# Patient Record
Sex: FEMALE | Race: WHITE
[De-identification: names, ages, dates, MRNs, and addresses within clinical notes are randomized per-mention and may not be internally consistent; named-entity substitution may affect disease eponyms.]

---

## 2023-12-13 ENCOUNTER — NON-APPOINTMENT (OUTPATIENT)
Age: 23
End: 2023-12-13

## 2023-12-14 ENCOUNTER — TRANSCRIPTION ENCOUNTER (OUTPATIENT)
Age: 23
End: 2023-12-14

## 2023-12-14 ENCOUNTER — APPOINTMENT (OUTPATIENT)
Dept: PLASTIC SURGERY | Facility: CLINIC | Age: 23
End: 2023-12-14
Payer: COMMERCIAL

## 2023-12-14 DIAGNOSIS — F64.9 GENDER IDENTITY DISORDER, UNSPECIFIED: ICD-10-CM

## 2023-12-14 PROBLEM — Z00.00 ENCOUNTER FOR PREVENTIVE HEALTH EXAMINATION: Status: ACTIVE | Noted: 2023-12-14

## 2023-12-14 PROCEDURE — 99204 OFFICE O/P NEW MOD 45 MIN: CPT

## 2023-12-17 RX ORDER — PROGESTERONE 200 MG/1
CAPSULE ORAL
Refills: 0 | Status: ACTIVE | COMMUNITY

## 2023-12-17 RX ORDER — ESTRADIOL VALERATE 20 MG/ML
20 INJECTION INTRAMUSCULAR
Refills: 0 | Status: ACTIVE | COMMUNITY

## 2023-12-17 RX ORDER — OMEPRAZOLE 40 MG/1
40 CAPSULE, DELAYED RELEASE ORAL
Refills: 0 | Status: ACTIVE | COMMUNITY

## 2023-12-19 NOTE — ASSESSMENT
[FreeTextEntry1] : Pt was seen and examined together by KALYN Aquino and Dr. Navid Gomes. Assessment and plan formulated and discussed at time of visit.

## 2023-12-19 NOTE — HISTORY OF PRESENT ILLNESS
[FreeTextEntry1] : Juana is a 23-year-old transgender female designated male at birth with a history of gender dysphoria she reports that her masculine facial features cause and exacerbate her feelings of.  Gender dysphoria and result in misgendering.  She started transitioning both socially in  October 2021, and medically April 2022.  She currently takes estradiol, progesterone and omeprazole.  She has a past medical history of depression anxiety and GERD.  She denies history of previous gender affirming surgeries or other procedures no other past surgical history.  She denies tobacco use.  Reports positive THC use.  She currently receives transgender care at [facility] in [facility] and receives mental health services through a private counselor     .

## 2024-01-19 ENCOUNTER — OUTPATIENT (OUTPATIENT)
Dept: OUTPATIENT SERVICES | Facility: HOSPITAL | Age: 24
LOS: 1 days | End: 2024-01-19
Payer: COMMERCIAL

## 2024-01-19 ENCOUNTER — RESULT REVIEW (OUTPATIENT)
Age: 24
End: 2024-01-19

## 2024-01-19 ENCOUNTER — APPOINTMENT (OUTPATIENT)
Age: 24
End: 2024-01-19
Payer: COMMERCIAL

## 2024-01-19 DIAGNOSIS — F64.9 GENDER IDENTITY DISORDER, UNSPECIFIED: ICD-10-CM

## 2024-01-19 DIAGNOSIS — Z00.8 ENCOUNTER FOR OTHER GENERAL EXAMINATION: ICD-10-CM

## 2024-01-19 PROCEDURE — 70486 CT MAXILLOFACIAL W/O DYE: CPT | Mod: 26

## 2024-01-19 PROCEDURE — 70486 CT MAXILLOFACIAL W/O DYE: CPT

## 2024-01-20 DIAGNOSIS — F64.9 GENDER IDENTITY DISORDER, UNSPECIFIED: ICD-10-CM

## 2024-12-06 VITALS
TEMPERATURE: 98 F | RESPIRATION RATE: 16 BRPM | HEART RATE: 81 BPM | WEIGHT: 223.55 LBS | DIASTOLIC BLOOD PRESSURE: 74 MMHG | OXYGEN SATURATION: 97 % | HEIGHT: 70 IN | SYSTOLIC BLOOD PRESSURE: 126 MMHG

## 2024-12-06 NOTE — ASU PATIENT PROFILE, ADULT - NSICDXPASTMEDICALHX_GEN_ALL_CORE_FT
PAST MEDICAL HISTORY:  Dyslipidemia     Gender identity uncertainty in adult     GERD (gastroesophageal reflux disease)

## 2024-12-09 ENCOUNTER — TRANSCRIPTION ENCOUNTER (OUTPATIENT)
Age: 24
End: 2024-12-09

## 2024-12-09 ENCOUNTER — APPOINTMENT (OUTPATIENT)
Dept: PLASTIC SURGERY | Facility: HOSPITAL | Age: 24
End: 2024-12-09

## 2024-12-09 ENCOUNTER — INPATIENT (INPATIENT)
Facility: HOSPITAL | Age: 24
LOS: 0 days | Discharge: HOME | End: 2024-12-10
Attending: SURGERY | Admitting: SURGERY
Payer: COMMERCIAL

## 2024-12-09 PROCEDURE — 21172 RCNST SUPR-LAT ORB RM&LW FHD: CPT

## 2024-12-09 PROCEDURE — 15769 GRFG AUTOL SOFT TISS DIR EXC: CPT

## 2024-12-09 PROCEDURE — 21139 RDCTJ FOREHEAD CNTRG&SETBACK: CPT

## 2024-12-09 PROCEDURE — 21193 RECONST LWR JAW W/O GRAFT: CPT | Mod: 50

## 2024-12-09 PROCEDURE — 21256 RECONSTRUCTION OF ORBIT: CPT | Mod: 50

## 2024-12-09 PROCEDURE — 15824 RHYTIDECTOMY FOREHEAD: CPT | Mod: 50

## 2024-12-09 PROCEDURE — 21122 GENIOP SLDG OSTEOT 2/>: CPT

## 2024-12-09 DEVICE — IMP IPS CUTTING GUIDE TRANSFORM LP XS: Type: IMPLANTABLE DEVICE | Status: FUNCTIONAL

## 2024-12-09 DEVICE — SURGCEL 4 X 8": Type: IMPLANTABLE DEVICE | Status: FUNCTIONAL

## 2024-12-09 DEVICE — IMP IPS MARKING GUIDE CRANIAL TRANSFORM EXTRA LP: Type: IMPLANTABLE DEVICE | Status: FUNCTIONAL

## 2024-12-09 DEVICE — IMP IPS CUTTING GUIDE W/PLANNING TRANSFORM LP: Type: IMPLANTABLE DEVICE | Status: FUNCTIONAL

## 2024-12-09 DEVICE — SCREW MAXDRIVE 1 LVL 2X7MM: Type: IMPLANTABLE DEVICE | Status: FUNCTIONAL

## 2024-12-09 RX ORDER — OMEPRAZOLE 20 MG/1
1 CAPSULE, DELAYED RELEASE ORAL
Refills: 0 | DISCHARGE

## 2024-12-09 RX ORDER — ACETAMINOPHEN 500MG 500 MG/1
1000 TABLET, COATED ORAL EVERY 8 HOURS
Refills: 0 | Status: DISCONTINUED | OUTPATIENT
Start: 2024-12-09 | End: 2024-12-10

## 2024-12-09 RX ORDER — CHLORHEXIDINE GLUCONATE, 0.12% ORAL RINSE 1.2 MG/ML
0.12 SOLUTION DENTAL
Qty: 2 | Refills: 0 | Status: ACTIVE | COMMUNITY
Start: 2024-12-09 | End: 1900-01-01

## 2024-12-09 RX ORDER — OXYCODONE AND ACETAMINOPHEN 5; 325 MG/1; MG/1
5-325 TABLET ORAL
Qty: 25 | Refills: 0 | Status: ACTIVE | COMMUNITY
Start: 2024-12-09 | End: 1900-01-01

## 2024-12-09 RX ORDER — ONDANSETRON HYDROCHLORIDE 4 MG/1
4 TABLET, FILM COATED ORAL EVERY 6 HOURS
Refills: 0 | Status: DISCONTINUED | OUTPATIENT
Start: 2024-12-09 | End: 2024-12-10

## 2024-12-09 RX ORDER — CHLORHEXIDINE GLUCONATE 1.2 MG/ML
15 RINSE ORAL
Refills: 0 | Status: DISCONTINUED | OUTPATIENT
Start: 2024-12-09 | End: 2024-12-10

## 2024-12-09 RX ORDER — DIAZEPAM 10 MG/1
5 TABLET ORAL EVERY 8 HOURS
Refills: 0 | Status: DISCONTINUED | OUTPATIENT
Start: 2024-12-09 | End: 2024-12-10

## 2024-12-09 RX ORDER — OXYCODONE HYDROCHLORIDE 30 MG/1
5 TABLET ORAL EVERY 4 HOURS
Refills: 0 | Status: DISCONTINUED | OUTPATIENT
Start: 2024-12-09 | End: 2024-12-10

## 2024-12-09 RX ORDER — POVIDONE, POLYVINYL ALCOHOL 20; 27 G/1000ML; G/1000ML
2 SOLUTION OPHTHALMIC
Refills: 0 | Status: DISCONTINUED | OUTPATIENT
Start: 2024-12-09 | End: 2024-12-10

## 2024-12-09 RX ORDER — ACETAMINOPHEN 500MG 500 MG/1
975 TABLET, COATED ORAL EVERY 8 HOURS
Refills: 0 | Status: DISCONTINUED | OUTPATIENT
Start: 2024-12-09 | End: 2024-12-10

## 2024-12-09 RX ORDER — 0.9 % SODIUM CHLORIDE 0.9 %
1000 INTRAVENOUS SOLUTION INTRAVENOUS
Refills: 0 | Status: DISCONTINUED | OUTPATIENT
Start: 2024-12-09 | End: 2024-12-09

## 2024-12-09 RX ORDER — METOCLOPRAMIDE HYDROCHLORIDE 10 MG/1
10 TABLET ORAL EVERY 8 HOURS
Refills: 0 | Status: DISCONTINUED | OUTPATIENT
Start: 2024-12-09 | End: 2024-12-10

## 2024-12-09 RX ORDER — SENNOSIDES 8.6 MG
2 TABLET ORAL AT BEDTIME
Refills: 0 | Status: DISCONTINUED | OUTPATIENT
Start: 2024-12-09 | End: 2024-12-10

## 2024-12-09 RX ORDER — 0.9 % SODIUM CHLORIDE 0.9 %
1000 INTRAVENOUS SOLUTION INTRAVENOUS
Refills: 0 | Status: DISCONTINUED | OUTPATIENT
Start: 2024-12-09 | End: 2024-12-10

## 2024-12-09 RX ORDER — HYDROMORPHONE HYDROCHLORIDE 2 MG/1
0.5 TABLET ORAL
Refills: 0 | Status: DISCONTINUED | OUTPATIENT
Start: 2024-12-09 | End: 2024-12-09

## 2024-12-09 RX ORDER — PANTOPRAZOLE SODIUM 40 MG/1
40 TABLET, DELAYED RELEASE ORAL
Refills: 0 | Status: DISCONTINUED | OUTPATIENT
Start: 2024-12-09 | End: 2024-12-10

## 2024-12-09 RX ORDER — ESTRADIOL 0.05MG/24H
0.4 PATCH, TRANSDERMAL SEMIWEEKLY TRANSDERMAL
Refills: 0 | DISCHARGE

## 2024-12-09 RX ORDER — DEXAMETHASONE 1.5 MG/1
10 TABLET ORAL EVERY 12 HOURS
Refills: 0 | Status: DISCONTINUED | OUTPATIENT
Start: 2024-12-09 | End: 2024-12-09

## 2024-12-09 RX ORDER — CEFAZOLIN SODIUM 10 G
2000 VIAL (EA) INJECTION EVERY 8 HOURS
Refills: 0 | Status: DISCONTINUED | OUTPATIENT
Start: 2024-12-09 | End: 2024-12-10

## 2024-12-09 RX ORDER — ACETAMINOPHEN, DIPHENHYDRAMINE HCL, PHENYLEPHRINE HCL 325; 25; 5 MG/1; MG/1; MG/1
3 TABLET ORAL AT BEDTIME
Refills: 0 | Status: DISCONTINUED | OUTPATIENT
Start: 2024-12-09 | End: 2024-12-10

## 2024-12-09 RX ORDER — DEXAMETHASONE 1.5 MG/1
10 TABLET ORAL EVERY 12 HOURS
Refills: 0 | Status: DISCONTINUED | OUTPATIENT
Start: 2024-12-09 | End: 2024-12-10

## 2024-12-09 RX ORDER — DIPHENHYDRAMINE HCL 25 MG
25 CAPSULE ORAL EVERY 4 HOURS
Refills: 0 | Status: DISCONTINUED | OUTPATIENT
Start: 2024-12-09 | End: 2024-12-10

## 2024-12-09 RX ORDER — GABAPENTIN 300 MG/1
300 CAPSULE ORAL EVERY 12 HOURS
Refills: 0 | Status: DISCONTINUED | OUTPATIENT
Start: 2024-12-09 | End: 2024-12-10

## 2024-12-09 RX ORDER — BENZOCAINE, MENTHOL 15; 3.6 MG/1; MG/1
1 LOZENGE ORAL EVERY 4 HOURS
Refills: 0 | Status: DISCONTINUED | OUTPATIENT
Start: 2024-12-09 | End: 2024-12-10

## 2024-12-09 RX ORDER — PETROLATUM 960 MG/G
1 OINTMENT TOPICAL EVERY 4 HOURS
Refills: 0 | Status: DISCONTINUED | OUTPATIENT
Start: 2024-12-09 | End: 2024-12-10

## 2024-12-09 RX ORDER — AMOXICILLIN AND CLAVULANATE POTASSIUM 500; 125 MG/1; MG/1
500-125 TABLET, FILM COATED ORAL TWICE DAILY
Qty: 6 | Refills: 0 | Status: ACTIVE | COMMUNITY
Start: 2024-12-09 | End: 1900-01-01

## 2024-12-09 RX ORDER — OXYCODONE HYDROCHLORIDE 30 MG/1
10 TABLET ORAL EVERY 4 HOURS
Refills: 0 | Status: DISCONTINUED | OUTPATIENT
Start: 2024-12-09 | End: 2024-12-10

## 2024-12-09 RX ORDER — LEVOCETIRIZINE DIHYDROCHLORIDE 5 MG/1
1 TABLET ORAL
Refills: 0 | DISCHARGE

## 2024-12-09 RX ADMIN — OXYCODONE HYDROCHLORIDE 10 MILLIGRAM(S): 30 TABLET ORAL at 15:54

## 2024-12-09 RX ADMIN — ACETAMINOPHEN 500MG 400 MILLIGRAM(S): 500 TABLET, COATED ORAL at 17:44

## 2024-12-09 RX ADMIN — HYDROMORPHONE HYDROCHLORIDE 0.5 MILLIGRAM(S): 2 TABLET ORAL at 12:55

## 2024-12-09 RX ADMIN — CHLORHEXIDINE GLUCONATE 15 MILLILITER(S): 1.2 RINSE ORAL at 18:02

## 2024-12-09 RX ADMIN — Medication 100 MILLIGRAM(S): at 16:40

## 2024-12-09 RX ADMIN — Medication 2 TABLET(S): at 23:49

## 2024-12-09 RX ADMIN — OXYCODONE HYDROCHLORIDE 10 MILLIGRAM(S): 30 TABLET ORAL at 21:56

## 2024-12-09 RX ADMIN — HYDROMORPHONE HYDROCHLORIDE 0.5 MILLIGRAM(S): 2 TABLET ORAL at 12:26

## 2024-12-09 RX ADMIN — DEXAMETHASONE 102 MILLIGRAM(S): 1.5 TABLET ORAL at 18:02

## 2024-12-09 RX ADMIN — OXYCODONE HYDROCHLORIDE 10 MILLIGRAM(S): 30 TABLET ORAL at 20:56

## 2024-12-09 RX ADMIN — GABAPENTIN 300 MILLIGRAM(S): 300 CAPSULE ORAL at 18:02

## 2024-12-09 RX ADMIN — OXYCODONE HYDROCHLORIDE 10 MILLIGRAM(S): 30 TABLET ORAL at 14:54

## 2024-12-09 RX ADMIN — Medication 100 MILLIGRAM(S): at 23:49

## 2024-12-09 RX ADMIN — ACETAMINOPHEN, DIPHENHYDRAMINE HCL, PHENYLEPHRINE HCL 3 MILLIGRAM(S): 325; 25; 5 TABLET ORAL at 23:49

## 2024-12-09 NOTE — PRE-ANESTHESIA EVALUATION ADULT - NSANTHOSAYNRD_GEN_A_CORE
negative...
No. VIOLETA screening performed.  STOP BANG Legend: 0-2 = LOW Risk; 3-4 = INTERMEDIATE Risk; 5-8 = HIGH Risk

## 2024-12-10 ENCOUNTER — TRANSCRIPTION ENCOUNTER (OUTPATIENT)
Age: 24
End: 2024-12-10

## 2024-12-10 VITALS
DIASTOLIC BLOOD PRESSURE: 74 MMHG | RESPIRATION RATE: 17 BRPM | HEART RATE: 97 BPM | TEMPERATURE: 97 F | OXYGEN SATURATION: 97 % | SYSTOLIC BLOOD PRESSURE: 145 MMHG

## 2024-12-10 PROCEDURE — C1889: CPT

## 2024-12-10 RX ADMIN — DEXAMETHASONE 102 MILLIGRAM(S): 1.5 TABLET ORAL at 05:19

## 2024-12-10 RX ADMIN — OXYCODONE HYDROCHLORIDE 5 MILLIGRAM(S): 30 TABLET ORAL at 06:18

## 2024-12-10 RX ADMIN — OXYCODONE HYDROCHLORIDE 10 MILLIGRAM(S): 30 TABLET ORAL at 10:30

## 2024-12-10 RX ADMIN — PANTOPRAZOLE SODIUM 40 MILLIGRAM(S): 40 TABLET, DELAYED RELEASE ORAL at 07:19

## 2024-12-10 RX ADMIN — OXYCODONE HYDROCHLORIDE 10 MILLIGRAM(S): 30 TABLET ORAL at 14:45

## 2024-12-10 RX ADMIN — GABAPENTIN 300 MILLIGRAM(S): 300 CAPSULE ORAL at 05:18

## 2024-12-10 RX ADMIN — ACETAMINOPHEN 500MG 400 MILLIGRAM(S): 500 TABLET, COATED ORAL at 10:01

## 2024-12-10 RX ADMIN — OXYCODONE HYDROCHLORIDE 10 MILLIGRAM(S): 30 TABLET ORAL at 14:02

## 2024-12-10 RX ADMIN — OXYCODONE HYDROCHLORIDE 10 MILLIGRAM(S): 30 TABLET ORAL at 09:59

## 2024-12-10 RX ADMIN — CHLORHEXIDINE GLUCONATE 15 MILLILITER(S): 1.2 RINSE ORAL at 05:18

## 2024-12-10 RX ADMIN — Medication 100 MILLIGRAM(S): at 07:35

## 2024-12-10 RX ADMIN — OXYCODONE HYDROCHLORIDE 5 MILLIGRAM(S): 30 TABLET ORAL at 05:18

## 2024-12-10 RX ADMIN — ACETAMINOPHEN 500MG 400 MILLIGRAM(S): 500 TABLET, COATED ORAL at 01:31

## 2024-12-10 NOTE — DISCHARGE NOTE NURSING/CASE MANAGEMENT/SOCIAL WORK - PATIENT PORTAL LINK FT
You can access the FollowMyHealth Patient Portal offered by [facility] by registering at the following website: [email or web address]. By joining [employer]’s FollowMyHealth portal, you will also be able to view your health information using other applications (apps) compatible with our system.

## 2024-12-10 NOTE — PROGRESS NOTE ADULT - SUBJECTIVE AND OBJECTIVE BOX
SUBJECTIVE:  Pt seen and evaluated at bedside this AM. Pt doing well this AM, slept comfortably o/n. Tolerating liquids, denies nausea/vomiting. Pain well controlled w/ current regimen.     MEDICATIONS  (STANDING):  acetaminophen     Tablet .. 975 milliGRAM(s) Oral every 8 hours  acetaminophen   IVPB .. 1000 milliGRAM(s) IV Intermittent every 8 hours  ceFAZolin   IVPB 2000 milliGRAM(s) IV Intermittent every 8 hours  chlorhexidine 0.12% Liquid 15 milliLiter(s) Swish and Spit two times a day  dexAMETHasone  IVPB 10 milliGRAM(s) IV Intermittent every 12 hours  gabapentin 300 milliGRAM(s) Oral every 12 hours  lactated ringers. 1000 milliLiter(s) (75 mL/Hr) IV Continuous <Continuous>  melatonin 3 milliGRAM(s) Oral at bedtime  pantoprazole    Tablet 40 milliGRAM(s) Oral before breakfast  senna 2 Tablet(s) Oral at bedtime    MEDICATIONS  (PRN):  AQUAPHOR (petrolatum Ointment) 1 Application(s) Topical every 4 hours PRN dry lips  artificial tears (preservative free) Ophthalmic Solution 2 Drop(s) Both EYES every 2 hours PRN Dry Eyes  benzocaine/menthol Lozenge 1 Lozenge Oral every 4 hours PRN Sore Throat  diazepam    Tablet 5 milliGRAM(s) Oral every 8 hours PRN Anxiety  diphenhydrAMINE 25 milliGRAM(s) Oral every 4 hours PRN Itching  metoclopramide Injectable 10 milliGRAM(s) IV Push every 8 hours PRN Nausea and/or Vomiting  ondansetron Injectable 4 milliGRAM(s) IV Push every 6 hours PRN Nausea and/or Vomiting  oxyCODONE    IR 5 milliGRAM(s) Oral every 4 hours PRN Moderate Pain (4 - 6)  oxyCODONE    IR 10 milliGRAM(s) Oral every 4 hours PRN Severe Pain (7 - 10)      Vital Signs Last 24 Hrs  T(C): 36.8 (10 Dec 2024 05:12), Max: 37.6 (09 Dec 2024 14:44)  T(F): 98.3 (10 Dec 2024 05:12), Max: 99.6 (09 Dec 2024 14:44)  HR: 75 (10 Dec 2024 05:12) (72 - 100)  BP: 142/76 (10 Dec 2024 05:12) (131/82 - 155/98)  BP(mean): 110 (09 Dec 2024 13:33) (99 - 115)  RR: 18 (10 Dec 2024 05:12) (12 - 18)  SpO2: 98% (10 Dec 2024 05:12) (93% - 98%)    Parameters below as of 10 Dec 2024 05:12  Patient On (Oxygen Delivery Method): room air        Physical Exam:  General: NAD, resting comfortably in bed  Head: moderate facial swelling, JPx1 serosang, incision cdi   Pulmonary: Nonlabored breathing, no respiratory distress  Cardiovascular: NSR  Abdominal: soft, NT/ND  Extremities: WWP, normal strength  Neuro: A/O x 3, CNs II-XII grossly intact, no focal deficits    I&O's Summary    09 Dec 2024 07:01  -  10 Dec 2024 07:00  --------------------------------------------------------  IN: 1150 mL / OUT: 4255 mL / NET: -3105 mL        LABS:              CAPILLARY BLOOD GLUCOSE            RADIOLOGY & ADDITIONAL STUDIES:

## 2024-12-10 NOTE — ASU DISCHARGE PLAN (ADULT/PEDIATRIC) - CARE PROVIDER_API CALL
Navid Gomes  Plastic Surgery  [address], [address]  [place] [address]  Phone: [phone]  Fax: [phone]  Follow Up Time: 1 week

## 2024-12-10 NOTE — ASU DISCHARGE PLAN (ADULT/PEDIATRIC) - ASU DC SPECIAL INSTRUCTIONSFT
MEDICATIONS:  >> A prescription for pain medication was e-prescribed to your pharmacy. Please take this as needed for severe pain.   >> Do not drive while taking the prescribed pain medication  >> Do not take the pain medication on an empty stomach, this may make you nauseous  >> Constipation is not uncommon when taking prescription pain medication. You may take an over the counter stool softener like Dulcolax or Sennakot to help with this.   >> You may take over the counter pain medication such as tylenol (acetaminophen) or Advil (ibuprofen) for pain.   >> Please use peridex rinse - swish and spit twice daily for 7 days.     ACTIVITY:  >> No bending or heavy lifting. Keep your head above the level of your heart. At your first post-op visit we will assess how you are doing and will adjust the restrictions as needed.   >> Sleep with head elevated on at least 2 pillows.     DIET:  >> Intraoral incisions: Clear liquid diet for first 48 hours then may advance to full liquid diet for 7 days. Then advance to soft diet. Avoid overly hot, cold, spicy, or acidic foods.   >> Do not drink alcohol in the immediate postoperative period and while taking prescription pain medication.     WOUND CARE:  >> Gently brush teeth and keep mouth clean after eating. At your first postop visit we will assess the wound and instruct you of any care needed.   >> Do not get your face wet, but you can wash your hair and the rest of your body. If you have a nasal splint - DO NOT GET THIS WET.   >> You may gently apply ice packs, or frozen peas to the nose and eye regions. Apply this for the first 48-72 hours. 20 minutes on and 20 minutes off. If you have a nasal splint, DO NOT apply ice to your nose.   >> Keep nasal splint on until your next postop visit, do not get this wet. Change gauze under nose as needed.  Nose will bleed over the next day or two.  This is normal.  It will be a mix of blood and mucus.  If you are noticing bleeding that does not stop or is saturating more than 2-3 gauzes per hour please call our office.   >> If you were given a facial/chin garment (Jaw Bra), please wear this 24/7 until your first postop visit.      Contact us with any questions or concerns. Please follow up with Dr. Ibarra within 1 week of discharge from the hospital. You may call [phone] to schedule an appointment.

## 2024-12-10 NOTE — ASU DISCHARGE PLAN (ADULT/PEDIATRIC) - FINANCIAL ASSISTANCE
[facility] provides services at a reduced cost to those who are determined to be eligible through [facility]’s financial assistance program. Information regarding [facility]’s financial assistance program can be found by going to [email or web address] or by calling [phone].

## 2024-12-10 NOTE — PATIENT PROFILE ADULT - FALL HARM RISK - HARM RISK INTERVENTIONS

## 2024-12-10 NOTE — DISCHARGE NOTE NURSING/CASE MANAGEMENT/SOCIAL WORK - NSDCPETBCESMAN_GEN_ALL_CORE
Please refill script per Dr Jarrett  Below patient     Clonazepan 1 mg   [facility]     has appt 8/14   If you are a smoker, it is important for your health to stop smoking. Please be aware that second hand smoke is also harmful.

## 2024-12-10 NOTE — DISCHARGE NOTE NURSING/CASE MANAGEMENT/SOCIAL WORK - FINANCIAL ASSISTANCE
[facility] provides services at a reduced cost to those who are determined to be eligible through [facility]’s financial assistance program. Information regarding [facility]’s financial assistance program can be found by going to [email or web address] or by calling [phone].

## 2024-12-10 NOTE — PROGRESS NOTE ADULT - ASSESSMENT
23yo M to F s/p FFS forehead reduction, frontal sinus reduction, supra and lateral orbital rim reduction, rhinoplasty, septoplasty, tracheal plasty, genioplasty, mandibular angle reduction and facial graft to lips.     - ATTILA removed  - FFS protocol: IVHL, CLD

## 2024-12-10 NOTE — ASU DISCHARGE PLAN (ADULT/PEDIATRIC) - PROCEDURE
forehead reduction, frontal sinus reduction, supra and lateral orbital rim reduction, rhinoplasty, septoplasty, tracheal plasty, genioplasty, mandibular angle reduction and facial graft to lips.

## 2024-12-17 DIAGNOSIS — F64.9 GENDER IDENTITY DISORDER, UNSPECIFIED: ICD-10-CM

## 2024-12-20 ENCOUNTER — APPOINTMENT (OUTPATIENT)
Dept: PLASTIC SURGERY | Facility: CLINIC | Age: 24
End: 2024-12-20
Payer: COMMERCIAL

## 2024-12-20 DIAGNOSIS — F64.9 GENDER IDENTITY DISORDER, UNSPECIFIED: ICD-10-CM

## 2024-12-20 PROCEDURE — 99024 POSTOP FOLLOW-UP VISIT: CPT

## 2024-12-20 RX ORDER — CHLORHEXIDINE GLUCONATE, 0.12% ORAL RINSE 1.2 MG/ML
0.12 SOLUTION DENTAL
Qty: 210 | Refills: 0 | Status: ACTIVE | COMMUNITY
Start: 2024-12-20 | End: 1900-01-01

## 2024-12-24 PROBLEM — K21.9 GASTRO-ESOPHAGEAL REFLUX DISEASE WITHOUT ESOPHAGITIS: Chronic | Status: ACTIVE | Noted: 2024-12-06

## 2024-12-24 PROBLEM — F66 OTHER SEXUAL DISORDERS: Chronic | Status: ACTIVE | Noted: 2024-12-06

## 2024-12-24 PROBLEM — E78.5 HYPERLIPIDEMIA, UNSPECIFIED: Chronic | Status: ACTIVE | Noted: 2024-12-06

## 2025-01-08 ENCOUNTER — APPOINTMENT (OUTPATIENT)
Dept: PLASTIC SURGERY | Facility: CLINIC | Age: 25
End: 2025-01-08
Payer: COMMERCIAL

## 2025-01-08 DIAGNOSIS — F64.9 GENDER IDENTITY DISORDER, UNSPECIFIED: ICD-10-CM

## 2025-01-08 PROCEDURE — 99024 POSTOP FOLLOW-UP VISIT: CPT

## 2025-01-30 ENCOUNTER — APPOINTMENT (OUTPATIENT)
Dept: PLASTIC SURGERY | Facility: CLINIC | Age: 25
End: 2025-01-30
Payer: COMMERCIAL

## 2025-01-30 DIAGNOSIS — F64.9 GENDER IDENTITY DISORDER, UNSPECIFIED: ICD-10-CM

## 2025-01-30 PROCEDURE — 99024 POSTOP FOLLOW-UP VISIT: CPT

## 2025-02-05 ENCOUNTER — APPOINTMENT (OUTPATIENT)
Dept: PLASTIC SURGERY | Facility: CLINIC | Age: 25
End: 2025-02-05

## 2025-06-05 ENCOUNTER — APPOINTMENT (OUTPATIENT)
Dept: PLASTIC SURGERY | Facility: CLINIC | Age: 25
End: 2025-06-05
Payer: COMMERCIAL

## 2025-06-05 PROCEDURE — 99213 OFFICE O/P EST LOW 20 MIN: CPT

## (undated) DEVICE — MODEL SUPPLEMENTAL HALF IPS LP 00132

## (undated) DEVICE — Device

## (undated) DEVICE — DRSG STERISTRIPS 0.5 X 4"

## (undated) DEVICE — ILLUMINATOR MINI VERSALIGHT EXTENDED FRAZIER TIP

## (undated) DEVICE — MODEL IPS MANDIBLE CRYSTAL LP

## (undated) DEVICE — ELCTR COLORADO 3CM

## (undated) DEVICE — BUR STRYKER CARBIDE CROSS CUT FISSURE 1.2MM

## (undated) DEVICE — DRSG TELFA 3 X 8

## (undated) DEVICE — SUT SILK 2-0 30" PSL

## (undated) DEVICE — SUT VICRYL 2-0 27" CT-2 UNDYED

## (undated) DEVICE — BEAVER BLADE MINI (ORANGE)

## (undated) DEVICE — ELCTR STRYKER NEPTUNE SMOKE EVACUATION PENCIL (GREEN)

## (undated) DEVICE — MODEL IPS SUPPLEMENTAL TRANSFORM HALF LP

## (undated) DEVICE — POSITIONER FOAM EGG CRATE ULNAR 2PCS (PINK)

## (undated) DEVICE — SUT PLAIN GUT FAST ABSORBING 5-0 PC-1

## (undated) DEVICE — RUBBERBAND STRL LTX FR 200/CA 3X1/8IN

## (undated) DEVICE — ELCTR BOVIE PENCIL HANDPIECE ROCKER SWITCH 15FT

## (undated) DEVICE — DRSG XEROFORM 5 X 9"

## (undated) DEVICE — SUT MONOCRYL 5-0 18" P-3 UNDYED

## (undated) DEVICE — BUR STRYKER EGG 4MM

## (undated) DEVICE — SUT MONOCRYL PLUS 4-0 18" PS-2 UNDYED

## (undated) DEVICE — NDL 18G BLUNT FILL PINK

## (undated) DEVICE — DRSG AQUAPLAST BLANK BLUSH 3 X 3"

## (undated) DEVICE — CATH NG SALEM SUMP 16FR

## (undated) DEVICE — DRAPE INSTRUMENT POUCH 6.75" X 11"

## (undated) DEVICE — DRSG TUBE SPANDAGE 8 10YD

## (undated) DEVICE — WARMING BLANKET LOWER ADULT

## (undated) DEVICE — MEDICATION LABELS W MARKER

## (undated) DEVICE — DRSG MASTISOL

## (undated) DEVICE — DRSG KERLIX ROLL LG 4.5"

## (undated) DEVICE — DRAIN JACKSON PRATT 7MM FLAT FULL NO TROCAR

## (undated) DEVICE — MODEL CRANIUM CRYSTAL IPS LP

## (undated) DEVICE — PACK PLASTIC ORTHOGNATHIC

## (undated) DEVICE — BLADE MANDIBULAR OSTEOTOMY

## (undated) DEVICE — PRESSURE INFUSOR BAG 1000ML

## (undated) DEVICE — RASP STRYKER LARGE TEAR CROSSCUT 14X7MM DISP

## (undated) DEVICE — ZIMMER DERMACARRIER SKIN GRAFT MESH 8"

## (undated) DEVICE — SUT VICRYL 6-0 18" P-3 UNDYED

## (undated) DEVICE — COMPRESSION CHIN-NECK SMALL

## (undated) DEVICE — SAW BLADE STRYKER RECIPROCATING 27MMX0.38MM

## (undated) DEVICE — DRSG CURITY GAUZE SPONGE 4 X 4" 12-PLY

## (undated) DEVICE — SUT VICRYL 3-0 27" SH

## (undated) DEVICE — VENODYNE/SCD SLEEVE CALF MEDIUM